# Patient Record
Sex: FEMALE | Race: WHITE | Employment: FULL TIME | URBAN - METROPOLITAN AREA
[De-identification: names, ages, dates, MRNs, and addresses within clinical notes are randomized per-mention and may not be internally consistent; named-entity substitution may affect disease eponyms.]

---

## 2019-08-30 ENCOUNTER — HOSPITAL ENCOUNTER (OUTPATIENT)
Dept: LAB | Age: 44
Discharge: HOME | End: 2019-08-30
Payer: OTHER GOVERNMENT

## 2019-08-30 PROCEDURE — 88304 TISSUE EXAM BY PATHOLOGIST: CPT

## 2021-04-20 ENCOUNTER — HOSPITAL ENCOUNTER (EMERGENCY)
Age: 46
Discharge: HOME | End: 2021-04-20
Attending: EMERGENCY MEDICINE
Payer: OTHER GOVERNMENT

## 2021-04-20 ENCOUNTER — APPOINTMENT (OUTPATIENT)
Dept: CT IMAGING | Age: 46
End: 2021-04-20
Attending: EMERGENCY MEDICINE
Payer: OTHER GOVERNMENT

## 2021-04-20 VITALS
RESPIRATION RATE: 16 BRPM | SYSTOLIC BLOOD PRESSURE: 125 MMHG | WEIGHT: 148 LBS | DIASTOLIC BLOOD PRESSURE: 92 MMHG | BODY MASS INDEX: 23.78 KG/M2 | HEART RATE: 57 BPM | HEIGHT: 66 IN | OXYGEN SATURATION: 100 % | TEMPERATURE: 98.2 F

## 2021-04-20 DIAGNOSIS — N20.0 KIDNEY STONE: ICD-10-CM

## 2021-04-20 DIAGNOSIS — R91.1 PULMONARY NODULE: ICD-10-CM

## 2021-04-20 DIAGNOSIS — R10.31 ABDOMINAL PAIN, RIGHT LOWER QUADRANT: Primary | ICD-10-CM

## 2021-04-20 DIAGNOSIS — N28.1 RENAL CYST, LEFT: ICD-10-CM

## 2021-04-20 LAB
ALBUMIN SERPL-MCNC: 4.2 G/DL (ref 3.4–5)
ALBUMIN/GLOB SERPL: 1.1 {RATIO} (ref 0.8–1.7)
ALP SERPL-CCNC: 70 U/L (ref 45–117)
ALT SERPL-CCNC: 24 U/L (ref 13–56)
ANION GAP SERPL CALC-SCNC: 4 MMOL/L (ref 3–18)
APPEARANCE UR: CLEAR
AST SERPL-CCNC: 11 U/L (ref 10–38)
BASOPHILS # BLD: 0.1 K/UL (ref 0–0.1)
BASOPHILS NFR BLD: 1 % (ref 0–2)
BILIRUB SERPL-MCNC: 0.5 MG/DL (ref 0.2–1)
BILIRUB UR QL: NEGATIVE
BUN SERPL-MCNC: 12 MG/DL (ref 7–18)
BUN/CREAT SERPL: 13 (ref 12–20)
CALCIUM SERPL-MCNC: 9.4 MG/DL (ref 8.5–10.1)
CHLORIDE SERPL-SCNC: 110 MMOL/L (ref 100–111)
CO2 SERPL-SCNC: 27 MMOL/L (ref 21–32)
COLOR UR: YELLOW
CREAT SERPL-MCNC: 0.95 MG/DL (ref 0.6–1.3)
DIFFERENTIAL METHOD BLD: ABNORMAL
EOSINOPHIL # BLD: 0.4 K/UL (ref 0–0.4)
EOSINOPHIL NFR BLD: 5 % (ref 0–5)
ERYTHROCYTE [DISTWIDTH] IN BLOOD BY AUTOMATED COUNT: 12.7 % (ref 11.6–14.5)
GLOBULIN SER CALC-MCNC: 3.9 G/DL (ref 2–4)
GLUCOSE SERPL-MCNC: 77 MG/DL (ref 74–99)
GLUCOSE UR STRIP.AUTO-MCNC: NEGATIVE MG/DL
HCG SERPL QL: NEGATIVE
HCT VFR BLD AUTO: 38.8 % (ref 35–45)
HGB BLD-MCNC: 12.9 G/DL (ref 12–16)
HGB UR QL STRIP: NEGATIVE
KETONES UR QL STRIP.AUTO: NEGATIVE MG/DL
LEUKOCYTE ESTERASE UR QL STRIP.AUTO: NEGATIVE
LIPASE SERPL-CCNC: 175 U/L (ref 73–393)
LYMPHOCYTES # BLD: 2.2 K/UL (ref 0.9–3.6)
LYMPHOCYTES NFR BLD: 28 % (ref 21–52)
MCH RBC QN AUTO: 31.7 PG (ref 24–34)
MCHC RBC AUTO-ENTMCNC: 33.2 G/DL (ref 31–37)
MCV RBC AUTO: 95.3 FL (ref 74–97)
MONOCYTES # BLD: 0.4 K/UL (ref 0.05–1.2)
MONOCYTES NFR BLD: 5 % (ref 3–10)
NEUTS SEG # BLD: 4.7 K/UL (ref 1.8–8)
NEUTS SEG NFR BLD: 60 % (ref 40–73)
NITRITE UR QL STRIP.AUTO: NEGATIVE
PH UR STRIP: 7.5 [PH] (ref 5–8)
PLATELET # BLD AUTO: 280 K/UL (ref 135–420)
PMV BLD AUTO: 9.8 FL (ref 9.2–11.8)
POTASSIUM SERPL-SCNC: 3.8 MMOL/L (ref 3.5–5.5)
PROT SERPL-MCNC: 8.1 G/DL (ref 6.4–8.2)
PROT UR STRIP-MCNC: NEGATIVE MG/DL
RBC # BLD AUTO: 4.07 M/UL (ref 4.2–5.3)
SODIUM SERPL-SCNC: 141 MMOL/L (ref 136–145)
SP GR UR REFRACTOMETRY: 1.01 (ref 1–1.03)
UROBILINOGEN UR QL STRIP.AUTO: 0.2 EU/DL (ref 0.2–1)
WBC # BLD AUTO: 7.8 K/UL (ref 4.6–13.2)

## 2021-04-20 PROCEDURE — 74011250636 HC RX REV CODE- 250/636: Performed by: EMERGENCY MEDICINE

## 2021-04-20 PROCEDURE — 80053 COMPREHEN METABOLIC PANEL: CPT

## 2021-04-20 PROCEDURE — 74177 CT ABD & PELVIS W/CONTRAST: CPT

## 2021-04-20 PROCEDURE — 96375 TX/PRO/DX INJ NEW DRUG ADDON: CPT

## 2021-04-20 PROCEDURE — 96374 THER/PROPH/DIAG INJ IV PUSH: CPT

## 2021-04-20 PROCEDURE — 99283 EMERGENCY DEPT VISIT LOW MDM: CPT

## 2021-04-20 PROCEDURE — 85025 COMPLETE CBC W/AUTO DIFF WBC: CPT

## 2021-04-20 PROCEDURE — 81003 URINALYSIS AUTO W/O SCOPE: CPT

## 2021-04-20 PROCEDURE — 84703 CHORIONIC GONADOTROPIN ASSAY: CPT

## 2021-04-20 PROCEDURE — 83690 ASSAY OF LIPASE: CPT

## 2021-04-20 PROCEDURE — 74011000636 HC RX REV CODE- 636: Performed by: EMERGENCY MEDICINE

## 2021-04-20 RX ORDER — ONDANSETRON 2 MG/ML
4 INJECTION INTRAMUSCULAR; INTRAVENOUS ONCE
Status: COMPLETED | OUTPATIENT
Start: 2021-04-20 | End: 2021-04-20

## 2021-04-20 RX ORDER — ONDANSETRON 4 MG/1
4 TABLET, ORALLY DISINTEGRATING ORAL
Qty: 6 TAB | Refills: 0 | OUTPATIENT
Start: 2021-04-20 | End: 2022-03-15

## 2021-04-20 RX ORDER — MORPHINE SULFATE 4 MG/ML
4 INJECTION INTRAVENOUS ONCE
Status: COMPLETED | OUTPATIENT
Start: 2021-04-20 | End: 2021-04-20

## 2021-04-20 RX ORDER — HYDROCODONE BITARTRATE AND ACETAMINOPHEN 5; 325 MG/1; MG/1
1 TABLET ORAL
Qty: 6 TAB | Refills: 0 | Status: SHIPPED | OUTPATIENT
Start: 2021-04-20 | End: 2021-04-23

## 2021-04-20 RX ADMIN — MORPHINE SULFATE 4 MG: 4 INJECTION, SOLUTION INTRAMUSCULAR; INTRAVENOUS at 12:05

## 2021-04-20 RX ADMIN — SODIUM CHLORIDE 1000 ML: 900 INJECTION, SOLUTION INTRAVENOUS at 12:04

## 2021-04-20 RX ADMIN — IOPAMIDOL 80 ML: 612 INJECTION, SOLUTION INTRAVENOUS at 12:53

## 2021-04-20 RX ADMIN — ONDANSETRON 4 MG: 2 INJECTION INTRAMUSCULAR; INTRAVENOUS at 12:04

## 2021-04-20 NOTE — ED TRIAGE NOTES
Patient c/o intermittent dysuria, RLQ abdominal pain diarrhea and nausea. She describes the pain as a \"nagging pain\". She states pain seems to radiate up into her right shoulder.

## 2021-04-20 NOTE — ED NOTES
I have reviewed discharge instructions with the patient. The patient verbalized understanding. Current Discharge Medication List      START taking these medications    Details   ondansetron (Zofran ODT) 4 mg disintegrating tablet Take 1 Tab by mouth every eight (8) hours as needed for Nausea or Vomiting. Qty: 6 Tab, Refills: 0      HYDROcodone-acetaminophen (Norco) 5-325 mg per tablet Take 1 Tab by mouth every six (6) hours as needed for Pain for up to 3 days. Max Daily Amount: 4 Tabs.   Qty: 6 Tab, Refills: 0    Associated Diagnoses: Abdominal pain, right lower quadrant; Kidney stone

## 2021-04-20 NOTE — ED PROVIDER NOTES
[address]  EMERGENCY DEPARTMENT HISTORY AND PHYSICAL EXAM       Date: 4/20/2021   Patient Name: Tye Garzon   YOB: 1975  Medical Record Number: [id number]    HISTORY OF PRESENTING ILLNESS:     Tye Garzon is a 55 y.o. female presenting with the noted PMH to the ED c/o right lower quadrant pain. Patient states she started with abdominal pain epigastrically started by 4 days ago. And then yesterday started noticing pain shifting to her right flank and right lower quadrant area. She states the pain is intermittent and sharp. No increasing or decreasing factors. She states she did notice for the last couple of days that it is worse when she urinates. Has not taken any pain medicines at home. No problems with bowel movements. She states she has had diarrhea for as long as she can remember. She states she is a goes about 2 or 3 times a day and it is loose. She states she had a procedure done in 2012 and that was her last menstrual period. Denies any injury or trauma. Denies any cough or cold symptoms. Denies any chest pain or shortness of breath.     Rest of complete systems reviewed and negative    Primary Care Provider: Medardo, MD Chris   Specialist:    Past Medical History:   Past Medical History:   Diagnosis Date    ADHD (attention deficit hyperactivity disorder)     Bipolar disorder (HCC)     anxiety and depression    Diverticulosis of colon     H/O seasonal allergies     History of kidney stones     Hypertension     Knee pain, right     medial meniscus tear    Migraine     Nasal congestion     Panic attacks         Past Surgical History:   Past Surgical History:   Procedure Laterality Date    HX ANKLE FRACTURE TX Right 2011    HX OTHER SURGICAL Right 1984    orbital muscle repair    HX OTHER SURGICAL Right 2010    orbital muscle revision        Social History:   Social History     Tobacco Use    Smoking status: Former Smoker     Packs/day: 1.00 Years: 20.00     Pack years: 20.00     Quit date: 2014     Years since quittin.3   Substance Use Topics    Alcohol use: Yes     Alcohol/week: 4.0 standard drinks     Types: 4 Cans of beer per week    Drug use: No        Allergies: Allergies   Allergen Reactions    Motrin [Ibuprofen] Swelling     Swollen tongue    Penicillins Hives        REVIEW OF SYSTEMS:  Review of Systems      PHYSICAL EXAM:  Vitals:    21 1046   BP: (!) 125/92   Pulse: (!) 57   Resp: 16   Temp: 98.2 °F (36.8 °C)   SpO2: 100%   Weight: 67.1 kg (148 lb)   Height: 5' 6\" (1.676 m)       Physical Exam   Vital signs reviewed. Alert oriented x 3 in NAD. HEENT: normocephalic atraumatic. Eyes are PERRLA EOMI. Conjunctiva normal.    External ears and nose normal.    Neck: normal external exam. No midline neck or back TTP. Lungs are clear to ascultation bilaterally. normal effort  Heart is regular rate and rhythm with no murmurs. Abdomen soft and non distended. Mild RLQ TTP. No rebound rigidity or guarding. Extremities: Moves all 4 extremities and no distress. Full range of motion. 2+ pulses and BCR in all 4 extremities. Neuro: Normal gait. 5 out of 5 strength in all 4 extremities. No facial droop. Skin examination: intact. no rashes. No petechia or purpura.       Medications   morphine injection 4 mg (4 mg IntraVENous Given 21 1205)   ondansetron (ZOFRAN) injection 4 mg (4 mg IntraVENous Given 21 1204)   sodium chloride 0.9 % bolus infusion 1,000 mL (1,000 mL IntraVENous New Bag 21 1204)   iopamidoL (ISOVUE 300) 61 % contrast injection  mL (80 mL IntraVENous Given 21 1253)       RESULTS:    Labs -   Labs Reviewed   CBC WITH AUTOMATED DIFF - Abnormal; Notable for the following components:       Result Value    RBC 4.07 (*)     All other components within normal limits   METABOLIC PANEL, COMPREHENSIVE   HCG QL SERUM   LIPASE   URINALYSIS W/ RFLX MICROSCOPIC       Radiologic Studies -  Ct Abd Pelv W Cont    Result Date: 4/20/2021  EXAM: CT ABDOMEN AND PELVIS WITH CONTRAST CLINICAL INDICATION/HISTORY: rlq pain. Intermittent dysuria, right lower quadrant abdominal pain, diarrhea, and nausea. COMPARISON: None TECHNIQUE:  CT abdomen and pelvis with 80 cc of Isovue IV contrast. All CT scans at this facility are performed using dose optimization technique as appropriate to the performed examination, to include automated exposure control, adjustment of the mA and/or kV according to patient's size (including appropriate matching for site-specific examinations), or use of an iterative reconstruction technique. FINDINGS: Lower chest: 0.7 x 0.7 cm right lower lobe lung nodule (4, 19) no consolidation or pleural effusion. Liver: Multiple subcentimeter hypoattenuating lesions in the liver are too small to characterize, but likely cysts. Biliary: Unremarkable. Pancreas: Unremarkable. Spleen: Unremarkable. Adrenal glands: Unremarkable. Kidneys: Punctate nonobstructing left renal stone. 1.5 cm low-attenuation left renal lesion measures 30 Hounsfield units. No hydronephrosis. Reproductive organs: Unremarkable. Bladder: Unremarkable. Bowel: Normal appendix. No evidence for bowel inflammation or obstruction. Lymph nodes: No pathologically enlarged lymph nodes. Vasculature: No aortic aneurysm. Portal, splenic, and superior mesenteric veins are patent. Duplicated IVC. Right internal iliac vein appears to be supplied by the left-sided IVC. Other: No free fluid or free air. Body wall: Small fat-containing umbilical hernia. Bones: No acute osseous abnormality. Grade 1 retrolisthesis of L5 on S1.     1. No acute abnormality in the abdomen or pelvis. Normal appendix. 2. Right lower lobe pulmonary nodule measuring 7 mm. Given size, recommend nonemergent chest CT for complete evaluation. 3. Punctate nonobstructing left renal stone.  4. Left renal lesion measuring 1.5 cm measures just above simple fluid density, likely a mildly complex cyst. Recommend nonemergent retroperitoneal ultrasound for further evaluation. MEDICAL DECISION MAKING    7400. Patient reassessed. Feeling better. Repeat examination of her abdomen is soft and nontender. Negative Puri's. Negative Inez's. CT scan does not show any acute appendicitis cholecystitis or pancreatitis. She does have a left intrarenal stone. Also does have a left renal cyst.  And a pulmonary nodule of 7 mm. Patient states she already knew about the pulmonary nodule. And she states as long as it has not changed and still 7 mm as what it was before. She will follow-up with her primary doctor to be rechecked. She states she also had a colonoscopy done 5 years ago. She will call to get scheduled for a colonoscopy rechecked. Results and precautions explained. Patient states understanding and agrees with plan. Patient states she cannot take NSAIDs secondary to her lithium use. Requesting pain medicines. Precautions explained. Patient has no new complaints, changes, or physical findings. Results were reviewed with the patient. Pt's questions and concerns were addressed. Care plan was outlined, including follow-up with PCP/specialist and return precautions were discussed. Patient is felt to be stable for discharge at this time. Diagnosis   Clinical Impression:   1. Abdominal pain, right lower quadrant    2. Renal cyst, left    3. Kidney stone    4. Pulmonary nodule           Follow-up Information     Follow up With Specialties Details Why [place] [address] EMERGENCY DEPT Emergency Medicine Go in 2 days If symptoms worsen, As needed [address] [facility]  [phone]          Current Discharge Medication List      START taking these medications    Details   ondansetron (Zofran ODT) 4 mg disintegrating tablet Take 1 Tab by mouth every eight (8) hours as needed for Nausea or Vomiting.   Qty: 6 Tab, Refills: 0 HYDROcodone-acetaminophen (Norco) 5-325 mg per tablet Take 1 Tab by mouth every six (6) hours as needed for Pain for up to 3 days. Max Daily Amount: 4 Tabs. Qty: 6 Tab, Refills: 0    Associated Diagnoses: Abdominal pain, right lower quadrant; Kidney stone             Discharged in stable and improved condition. This chart was completed using Dragon, a dictation transcription service. Errors may have resulted from using this device.

## 2022-03-15 ENCOUNTER — HOSPITAL ENCOUNTER (EMERGENCY)
Age: 47
Discharge: HOME | End: 2022-03-15
Attending: EMERGENCY MEDICINE
Payer: OTHER GOVERNMENT

## 2022-03-15 ENCOUNTER — APPOINTMENT (OUTPATIENT)
Dept: CT IMAGING | Age: 47
End: 2022-03-15
Attending: PHYSICIAN ASSISTANT
Payer: OTHER GOVERNMENT

## 2022-03-15 VITALS
BODY MASS INDEX: 23.78 KG/M2 | TEMPERATURE: 98.5 F | WEIGHT: 148 LBS | OXYGEN SATURATION: 100 % | HEIGHT: 66 IN | RESPIRATION RATE: 16 BRPM | DIASTOLIC BLOOD PRESSURE: 78 MMHG | HEART RATE: 78 BPM | SYSTOLIC BLOOD PRESSURE: 131 MMHG

## 2022-03-15 DIAGNOSIS — R42 DIZZINESS: ICD-10-CM

## 2022-03-15 DIAGNOSIS — S09.90XA CLOSED HEAD INJURY, INITIAL ENCOUNTER: Primary | ICD-10-CM

## 2022-03-15 DIAGNOSIS — F07.81 POST CONCUSSION SYNDROME: ICD-10-CM

## 2022-03-15 PROCEDURE — 96374 THER/PROPH/DIAG INJ IV PUSH: CPT

## 2022-03-15 PROCEDURE — 74011250636 HC RX REV CODE- 250/636: Performed by: EMERGENCY MEDICINE

## 2022-03-15 PROCEDURE — 99284 EMERGENCY DEPT VISIT MOD MDM: CPT

## 2022-03-15 PROCEDURE — 70450 CT HEAD/BRAIN W/O DYE: CPT

## 2022-03-15 RX ORDER — PROCHLORPERAZINE MALEATE 10 MG
10 TABLET ORAL
Qty: 12 TABLET | Refills: 0 | Status: SHIPPED | OUTPATIENT
Start: 2022-03-15 | End: 2022-03-22

## 2022-03-15 RX ORDER — MECLIZINE HCL 12.5 MG 12.5 MG/1
25 TABLET ORAL
Status: COMPLETED | OUTPATIENT
Start: 2022-03-15 | End: 2022-03-15

## 2022-03-15 RX ORDER — MECLIZINE HYDROCHLORIDE 25 MG/1
TABLET ORAL
Qty: 20 TABLET | Refills: 0 | Status: SHIPPED | OUTPATIENT
Start: 2022-03-15

## 2022-03-15 RX ORDER — ONDANSETRON 2 MG/ML
4 INJECTION INTRAMUSCULAR; INTRAVENOUS
Status: COMPLETED | OUTPATIENT
Start: 2022-03-15 | End: 2022-03-15

## 2022-03-15 RX ORDER — BUTALBITAL, ACETAMINOPHEN AND CAFFEINE 300; 40; 50 MG/1; MG/1; MG/1
1 CAPSULE ORAL
Qty: 8 CAPSULE | Refills: 0 | Status: SHIPPED | OUTPATIENT
Start: 2022-03-15 | End: 2022-03-19

## 2022-03-15 RX ADMIN — SODIUM CHLORIDE 1000 ML: 9 INJECTION, SOLUTION INTRAVENOUS at 16:59

## 2022-03-15 RX ADMIN — ONDANSETRON 4 MG: 2 INJECTION INTRAMUSCULAR; INTRAVENOUS at 16:59

## 2022-03-15 RX ADMIN — MECLIZINE 25 MG: 12.5 TABLET ORAL at 16:59

## 2022-03-15 NOTE — ED NOTES
Discharge instructions reviewed with patient. Patient verbalized understanding. Patient advised to follow up as directed on discharge instructions. Patient denies questions, needs or concerns at this time. Patient verbalized understanding. No s/sx of distress noted.

## 2022-03-15 NOTE — ED TRIAGE NOTES
Pt reports hit left side head on attic steps pain radiating from left temple to back of neck, denies LOC, not on blood thinners. Admits to nausea.

## 2022-03-15 NOTE — ED PROVIDER NOTES
EMERGENCY DEPARTMENT HISTORY AND PHYSICAL EXAM    4:44 PM      Date: 3/15/2022  Patient Name: Elisha Overton    History of Presenting Illness     Chief Complaint   Patient presents with    Head Pain         History Provided By: Patient  Location/Duration/Severity/Modifying factors   The patient is a 70-year-old female with a history of migraine, hypertension, kidney stones, bipolar disorder, nasal congestion, orthopedic injuries, the presents emergency department with complaint of dizziness that began after hitting her head this morning. 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 Patient denies loss consciousness but she said she felt dizzy immediately. Patient is having headache and nausea and has been feeling well since this happened early this morning. The patient did get better but it continues to worsen so she came in for evaluation. Patient did not fully lose consciousness and does not on any anticoagulation. Patient denies any history of vertigo. Patient has nausea without vomiting. Patient is a former smoker, occasional drinker, denies any drug use. PCP: Chris Batres MD    Current Facility-Administered Medications   Medication Dose Route Frequency Provider Last Rate Last Admin    sodium chloride 0.9 % bolus infusion 1,000 mL  1,000 mL IntraVENous ONCE Berhane GERMAN MD 1,000 mL/hr at 03/15/22 1659 1,000 mL at 03/15/22 1659     Current Outpatient Medications   Medication Sig Dispense Refill    butalbital-acetaminophen-caff (Fioricet) -40 mg per capsule Take 1 Capsule by mouth every four (4) hours as needed for Headache for up to 4 days. 8 Capsule 0    prochlorperazine (Compazine) 10 mg tablet Take 1 Tablet by mouth every eight (8) hours as needed for Nausea for up to 7 days.  12 Tablet 0    meclizine (ANTIVERT) 25 mg tablet Take 1 tablet by mouth every 6 hours for the next 3 days. Then stop taking the meclizine. Restart the meclizine for 3 day intervals if vertigo/ dizziness returns. 20 Tablet 0    QUEtiapine (SEROQUEL) 50 mg tablet Take 50 mg by mouth daily.  metoprolol tartrate (LOPRESSOR) 25 mg tablet Take  by mouth daily.  lithium carbonate 300 mg tablet Take 300 mg by mouth two (2) times a day.  topiramate (TOPAMAX) 50 mg tablet Take 50 mg by mouth two (2) times a day.  amLODIPine (NORVASC) 10 mg tablet Take 10 mg by mouth daily.  lamoTRIgine (LAMICTAL) 150 mg tablet Take 150 mg by mouth nightly.  multivitamin (ONE A DAY) tablet Take 1 Tab by mouth daily. Past History     Past Medical History:  Past Medical History:   Diagnosis Date    ADHD (attention deficit hyperactivity disorder)     Bipolar disorder (HCC)     anxiety and depression    Diverticulosis of colon     H/O seasonal allergies     History of kidney stones     Hypertension     Knee pain, right     medial meniscus tear    Migraine     Nasal congestion     Panic attacks        Past Surgical History:  Past Surgical History:   Procedure Laterality Date    HX ANKLE FRACTURE TX Right     HX OTHER SURGICAL Right     orbital muscle repair    HX OTHER SURGICAL Right     orbital muscle revision       Family History:  Family History   Problem Relation Age of Onset    Hypertension Mother     Cancer Paternal Grandfather         bone       Social History:  Social History     Tobacco Use    Smoking status: Former Smoker     Packs/day: 1.00     Years: 20.00     Pack years: 20.00     Quit date: 2014     Years since quittin.2    Smokeless tobacco: Not on file   Substance Use Topics    Alcohol use: Yes     Alcohol/week: 4.0 standard drinks     Types: 4 Cans of beer per week    Drug use: No       Allergies:   Allergies   Allergen Reactions    Motrin [Ibuprofen] Swelling     Swollen tongue    Penicillins Hives         Review of Systems Review of Systems   Constitutional: Negative for activity change, fatigue and fever. HENT: Negative for congestion and rhinorrhea. Eyes: Negative for visual disturbance. Respiratory: Negative for shortness of breath. Cardiovascular: Negative for chest pain and palpitations. Gastrointestinal: Positive for nausea. Negative for abdominal pain, diarrhea and vomiting. Genitourinary: Negative for dysuria and hematuria. Musculoskeletal: Negative for back pain. Skin: Negative for rash. Neurological: Positive for dizziness and headaches. Negative for weakness and light-headedness. All other systems reviewed and are negative. Physical Exam     Visit Vitals  BP (!) 124/94   Pulse 78   Temp 98.5 °F (36.9 °C)   Resp 16   Ht 5' 6\" (1.676 m)   Wt 67.1 kg (148 lb)   SpO2 100%   BMI 23.89 kg/m²         Physical Exam  Vitals and nursing note reviewed. Constitutional:       General: She is not in acute distress. Appearance: She is well-developed. HENT:      Head: Normocephalic. Comments: Left temporal and maxillary pain without ecchymosis, no step-off noted     Right Ear: External ear normal.      Left Ear: External ear normal.      Nose: Nose normal.   Eyes:      General: No scleral icterus. Conjunctiva/sclera: Conjunctivae normal.      Pupils: Pupils are equal, round, and reactive to light. Comments: Horizontal nystagmus noted with the left ear to the bed greater than the right   Neck:      Thyroid: No thyromegaly. Vascular: No JVD. Trachea: No tracheal deviation. Cardiovascular:      Rate and Rhythm: Normal rate and regular rhythm. Heart sounds: Normal heart sounds. No murmur heard. No friction rub. No gallop. Pulmonary:      Effort: Pulmonary effort is normal.      Breath sounds: Normal breath sounds. Chest:      Chest wall: No tenderness. Abdominal:      General: Bowel sounds are normal. There is no distension. Palpations: Abdomen is soft. Tenderness: There is no abdominal tenderness. There is no guarding or rebound. Musculoskeletal:         General: No tenderness. Normal range of motion. Cervical back: Normal range of motion and neck supple. Lymphadenopathy:      Cervical: No cervical adenopathy. Skin:     General: Skin is warm and dry. Neurological:      Mental Status: She is alert and oriented to person, place, and time. Cranial Nerves: No cranial nerve deficit. Coordination: Coordination normal.      Comments: No sensory loss, Gait normal, Motor 5/5   Psychiatric:         Behavior: Behavior normal.         Thought Content: Thought content normal.         Judgment: Judgment normal.           Diagnostic Study Results     Labs -  No results found for this or any previous visit (from the past 12 hour(s)). Radiologic Studies -   CT HEAD WO CONT   Final Result      No acute intracranial abnormality. Thank you for your referral.            Medical Decision Making   I am the first provider for this patient. I reviewed the vital signs, available nursing notes, past medical history, past surgical history, family history and social history. Vital Signs-Reviewed the patient's vital signs. Records Reviewed: Nursing Notes, Old Medical Records, Previous Radiology Studies and Previous Laboratory Studies (Time of Review: 4:44 PM)    ED Course: Progress Notes, Reevaluation, and Consults:     Patient CT is reassuring and the patient is feeling much better. The patient's  at the bedside and discussed with her that she likely has a postconcussive syndrome and likely a peripheral vertigo. The patient has a plan to see her primary doctor tomorrow. We will start supportive care and have her follow-up closely as an outpatient. The patient was educated on signs of worsening. Workup and recommendations were reviewed with the patient and all questions were answered.   The patient understands the plan and will proceed with close outpatient care. I have encouraged the patient to return if at all worsened or concerned. 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 The patient hit and retirement open door to to the attic. The patient now has positional dizziness with headache with movement. I suspect this is mostly a peripheral dizziness however we will CT the patient's head given the trauma, supportive care, then reevaluate. Procedures      Diagnosis     Clinical Impression:   1. Closed head injury, initial encounter    2. Dizziness    3. Post concussion syndrome        Disposition: DC    Follow-up Information     Follow up With Specialties Details Why [address]  In 1 day      [facility] EMERGENCY DEPT Emergency Medicine  As needed, If symptoms worsen [address] [facility]  [phone]           Patient's Medications   Start Taking    BUTALBITAL-ACETAMINOPHEN-CAFF (FIORICET) -40 MG PER CAPSULE    Take 1 Capsule by mouth every four (4) hours as needed for Headache for up to 4 days. MECLIZINE (ANTIVERT) 25 MG TABLET    Take 1 tablet by mouth every 6 hours for the next 3 days. Then stop taking the meclizine. Restart the meclizine for 3 day intervals if vertigo/ dizziness returns. PROCHLORPERAZINE (COMPAZINE) 10 MG TABLET    Take 1 Tablet by mouth every eight (8) hours as needed for Nausea for up to 7 days. Continue Taking    AMLODIPINE (NORVASC) 10 MG TABLET    Take 10 mg by mouth daily. LAMOTRIGINE (LAMICTAL) 150 MG TABLET    Take 150 mg by mouth nightly.     LITHIUM CARBONATE 300 MG TABLET    Take 300 mg by mouth two (2) times a day. METOPROLOL TARTRATE (LOPRESSOR) 25 MG TABLET    Take  by mouth daily. MULTIVITAMIN (ONE A DAY) TABLET    Take 1 Tab by mouth daily. QUETIAPINE (SEROQUEL) 50 MG TABLET    Take 50 mg by mouth daily. TOPIRAMATE (TOPAMAX) 50 MG TABLET    Take 50 mg by mouth two (2) times a day. These Medications have changed    No medications on file   Stop Taking    CETIRIZINE (ZYRTEC) 10 MG TABLET    Take 10 mg by mouth daily as needed for Allergies. ONDANSETRON (ZOFRAN ODT) 4 MG DISINTEGRATING TABLET    Take 1 Tab by mouth every eight (8) hours as needed for Nausea or Vomiting. Disclaimer: Sections of this note are dictated using utilizing voice recognition software. Minor typographical errors may be present. If questions arise, please do not hesitate to contact me or call our department.

## 2024-04-05 ENCOUNTER — HOSPITAL ENCOUNTER (OUTPATIENT)
Facility: HOSPITAL | Age: 49
Setting detail: SPECIMEN
Discharge: HOME | End: 2024-04-08

## 2024-04-05 LAB — LITHIUM SERPL-SCNC: 0.3 MMOL/L (ref 0.6–1.2)

## 2024-04-05 PROCEDURE — 80178 ASSAY OF LITHIUM: CPT
